# Patient Record
Sex: MALE | Race: WHITE
[De-identification: names, ages, dates, MRNs, and addresses within clinical notes are randomized per-mention and may not be internally consistent; named-entity substitution may affect disease eponyms.]

---

## 2019-01-16 PROBLEM — Z00.00 ENCOUNTER FOR PREVENTIVE HEALTH EXAMINATION: Status: ACTIVE | Noted: 2019-01-16

## 2019-01-29 ENCOUNTER — APPOINTMENT (OUTPATIENT)
Dept: PAIN MANAGEMENT | Facility: CLINIC | Age: 59
End: 2019-01-29
Payer: COMMERCIAL

## 2019-01-29 VITALS
BODY MASS INDEX: 33.86 KG/M2 | WEIGHT: 250 LBS | HEIGHT: 72 IN | SYSTOLIC BLOOD PRESSURE: 112 MMHG | DIASTOLIC BLOOD PRESSURE: 70 MMHG

## 2019-01-29 DIAGNOSIS — M75.52 BURSITIS OF LEFT SHOULDER: ICD-10-CM

## 2019-01-29 DIAGNOSIS — Z87.39 PERSONAL HISTORY OF OTHER DISEASES OF THE MUSCULOSKELETAL SYSTEM AND CONNECTIVE TISSUE: ICD-10-CM

## 2019-01-29 DIAGNOSIS — M79.18 MYALGIA, OTHER SITE: ICD-10-CM

## 2019-01-29 DIAGNOSIS — M19.012 PRIMARY OSTEOARTHRITIS, LEFT SHOULDER: ICD-10-CM

## 2019-01-29 DIAGNOSIS — Z80.9 FAMILY HISTORY OF MALIGNANT NEOPLASM, UNSPECIFIED: ICD-10-CM

## 2019-01-29 DIAGNOSIS — Z83.3 FAMILY HISTORY OF DIABETES MELLITUS: ICD-10-CM

## 2019-01-29 DIAGNOSIS — Z86.79 PERSONAL HISTORY OF OTHER DISEASES OF THE CIRCULATORY SYSTEM: ICD-10-CM

## 2019-01-29 DIAGNOSIS — Z78.9 OTHER SPECIFIED HEALTH STATUS: ICD-10-CM

## 2019-01-29 PROCEDURE — 99204 OFFICE O/P NEW MOD 45 MIN: CPT

## 2019-01-29 RX ORDER — LISINOPRIL 10 MG/1
10 TABLET ORAL
Refills: 0 | Status: ACTIVE | COMMUNITY

## 2019-01-29 RX ORDER — TRAMADOL HYDROCHLORIDE 50 MG/1
50 TABLET, COATED ORAL
Refills: 0 | Status: ACTIVE | COMMUNITY

## 2019-01-29 NOTE — PHYSICAL EXAM
[Normal muscle bulk without asymmetry] : normal muscle bulk without asymmetry [Within functional limits and without pain] : within functional limits and without pain [Facet Tenderness] : no facet tenderness [Spurling] : negative Spurling's Test [Schroeder] : positive Schroeder Test [Neer's] : positive Neer's Test [] : Motor: [NL] : normal and symmetric bilaterally [Normal] : Normal affect [de-identified] : left shoulder limited ROM

## 2019-01-29 NOTE — HISTORY OF PRESENT ILLNESS
[___ mths] : [unfilled] month(s) ago [7] : a maximum pain level of 7/10 [Sharp] : sharp [Rest] : rest [FreeTextEntry1] : \par HPI\par \par Mr. REJI KOROMA is a 58 year M with pmhx of proteinuria, htn, right bicep repair 2013, L4-5 decompression 30 years ago.  Presents with left shoulder pain radiating lateral arm \par \par \par Previous and current pain medications/doses/effects:\par \par Previous Pain Treatments:\par \par PT with mild relief\par \par Previous Pain Injections:\par \par PT without relief\par \par Previous Diagnostic Studies/Images:\par \par \par  [FreeTextEntry7] : left shoulder/arm [FreeTextEntry3] : movement

## 2019-01-29 NOTE — ASSESSMENT
[FreeTextEntry1] : subacromial bursitis pain refractory to conservative treatments. will schedule repeat left subacromial bursa steroid injection r/b/a discussed\par \par informed patient of risks of steroid administration including transient worsening of blood glucose, htn, mood changes, progressive osteoporosis.  Encouraged to call with questions and concerns.\par \par XR to evaluate for left shoulder osteoarthritis\par \par PT pending intervention\par \par \par The above diagnosis and treatment plan is medically reasonable and necessary based on the patient encounter.\par \par There were no barriers to communication.\par Informed patient that I would be available for any additional questions.\par Patient was instructed to call with any worsening symptoms including severe pain, new numbness/weakness, or changes in the bowel/bladder function. \par \par Instructed patient to maintain pain diary to monitor pain level, mobility, and function.\par \par \par

## 2019-02-14 ENCOUNTER — APPOINTMENT (OUTPATIENT)
Dept: PAIN MANAGEMENT | Facility: HOSPITAL | Age: 59
End: 2019-02-14

## 2023-02-13 ENCOUNTER — OFFICE (OUTPATIENT)
Dept: URBAN - METROPOLITAN AREA CLINIC 122 | Facility: CLINIC | Age: 63
Setting detail: OPHTHALMOLOGY
End: 2023-02-13
Payer: COMMERCIAL

## 2023-02-13 DIAGNOSIS — H25.13: ICD-10-CM

## 2023-02-13 DIAGNOSIS — H52.4: ICD-10-CM

## 2023-02-13 DIAGNOSIS — H40.012: ICD-10-CM

## 2023-02-13 PROBLEM — D31.32 CHOROIDAL NEVUS, LEFT EYE: Status: ACTIVE | Noted: 2023-02-13

## 2023-02-13 PROCEDURE — 92015 DETERMINE REFRACTIVE STATE: CPT | Performed by: OPHTHALMOLOGY

## 2023-02-13 PROCEDURE — 92014 COMPRE OPH EXAM EST PT 1/>: CPT | Performed by: OPHTHALMOLOGY

## 2023-02-13 PROCEDURE — 92250 FUNDUS PHOTOGRAPHY W/I&R: CPT | Performed by: OPHTHALMOLOGY

## 2023-02-13 PROCEDURE — 92083 EXTENDED VISUAL FIELD XM: CPT | Performed by: OPHTHALMOLOGY

## 2023-02-13 ASSESSMENT — REFRACTION_MANIFEST
OD_AXIS: 135
OD_VA1: 20/20
OS_VA1: 20/20
OD_ADD: +2.50
OS_SPHERE: -0.75
OD_CYLINDER: -1.50
OS_CYLINDER: -2.00
OS_SPHERE: -0.50
OS_AXIS: 30
OD_CYLINDER: -1.50
OD_SPHERE: +0.50
OS_AXIS: 30
OD_ADD: +2.50
OS_VA1: 20/20
OS_ADD: +2.50
OS_ADD: +2.50
OD_SPHERE: +0.50
OD_AXIS: 120
OS_CYLINDER: -2.00
OD_VA1: 20/20

## 2023-02-13 ASSESSMENT — TEAR BREAK UP TIME (TBUT)
OS_TBUT: 2+
OD_TBUT: 2+

## 2023-02-13 ASSESSMENT — PACHYMETRY
OS_CT_CORRECTION: 1
OD_CT_CORRECTION: 1
OD_CT_UM: 533
OS_CT_UM: 536

## 2023-02-13 ASSESSMENT — VISUAL ACUITY
OD_BCVA: 20/25+2
OS_BCVA: 20/20

## 2023-02-13 ASSESSMENT — SPHEQUIV_DERIVED
OS_SPHEQUIV: -1.75
OS_SPHEQUIV: -1.5
OD_SPHEQUIV: -0.25
OS_SPHEQUIV: -1.75
OD_SPHEQUIV: -0.25

## 2023-02-13 ASSESSMENT — TONOMETRY
OS_IOP_MMHG: 19
OD_IOP_MMHG: 15

## 2023-02-13 ASSESSMENT — REFRACTION_AUTOREFRACTION
OD_SPHERE: PLANO
OS_SPHERE: -0.75
OS_CYLINDER: -2.00
OD_CYLINDER: -1.25
OD_AXIS: 127
OS_AXIS: 32

## 2023-02-13 ASSESSMENT — REFRACTION_CURRENTRX
OS_VPRISM_DIRECTION: SV
OD_VPRISM_DIRECTION: SV
OD_CYLINDER: -1.50
OS_OVR_VA: 20/
OD_OVR_VA: 20/
OD_SPHERE: +0.50
OS_AXIS: 30
OS_CYLINDER: -2.00
OD_AXIS: 120
OS_SPHERE: -0.75

## 2023-02-13 ASSESSMENT — CORNEAL SURGICAL SCARRING: OS_SCARRING: MID PERIPHERAL ANTERIOR STROMAL

## 2023-02-13 ASSESSMENT — CONFRONTATIONAL VISUAL FIELD TEST (CVF)
OS_FINDINGS: FULL
OD_FINDINGS: FULL

## 2025-06-10 ENCOUNTER — OFFICE (OUTPATIENT)
Dept: URBAN - METROPOLITAN AREA CLINIC 122 | Facility: CLINIC | Age: 65
Setting detail: OPHTHALMOLOGY
End: 2025-06-10
Payer: COMMERCIAL

## 2025-06-10 DIAGNOSIS — D31.32: ICD-10-CM

## 2025-06-10 DIAGNOSIS — H52.4: ICD-10-CM

## 2025-06-10 DIAGNOSIS — H04.123: ICD-10-CM

## 2025-06-10 DIAGNOSIS — H40.012: ICD-10-CM

## 2025-06-10 DIAGNOSIS — H25.13: ICD-10-CM

## 2025-06-10 PROBLEM — H17.9 CORNEAL SCAR: Status: ACTIVE | Noted: 2025-06-10

## 2025-06-10 PROCEDURE — 92133 CPTRZD OPH DX IMG PST SGM ON: CPT | Performed by: OPHTHALMOLOGY

## 2025-06-10 PROCEDURE — 92014 COMPRE OPH EXAM EST PT 1/>: CPT | Performed by: OPHTHALMOLOGY

## 2025-06-10 PROCEDURE — 92015 DETERMINE REFRACTIVE STATE: CPT | Performed by: OPHTHALMOLOGY

## 2025-06-10 PROCEDURE — 92083 EXTENDED VISUAL FIELD XM: CPT | Performed by: OPHTHALMOLOGY

## 2025-06-10 ASSESSMENT — REFRACTION_MANIFEST
OD_CYLINDER: -1.50
OS_AXIS: 30
OD_AXIS: 135
OD_ADD: +2.50
OS_VA1: 20/20
OD_VA1: 20/20
OS_ADD: +2.50
OS_CYLINDER: -2.00
OS_ADD: +2.50
OS_ADD: +2.50
OS_SPHERE: -0.75
OD_VA1: 20/20
OS_CYLINDER: -1.75
OD_AXIS: 145
OD_CYLINDER: -1.50
OD_SPHERE: +0.50
OS_SPHERE: -0.50
OD_ADD: +2.50
OD_VA1: 20/20
OD_CYLINDER: -1.25
OS_SPHERE: -0.50
OS_AXIS: 30
OD_AXIS: 120
OD_ADD: +2.50
OD_SPHERE: PLANO
OD_SPHERE: +0.50
OS_AXIS: 25
OS_CYLINDER: -2.00

## 2025-06-10 ASSESSMENT — REFRACTION_CURRENTRX
OS_OVR_VA: 20/
OD_SPHERE: +0.50
OD_VPRISM_DIRECTION: SV
OD_OVR_VA: 20/
OD_AXIS: 120
OS_ADD: +2.50
OS_VPRISM_DIRECTION: SV
OS_AXIS: 30
OS_CYLINDER: -2.00
OD_CYLINDER: -1.50
OS_SPHERE: -0.50
OD_ADD: +2.50

## 2025-06-10 ASSESSMENT — REFRACTION_AUTOREFRACTION
OD_SPHERE: PLANO
OS_SPHERE: -0.75
OD_AXIS: 127
OS_CYLINDER: -2.00
OS_AXIS: 32
OD_CYLINDER: -1.25

## 2025-06-10 ASSESSMENT — CONFRONTATIONAL VISUAL FIELD TEST (CVF)
OD_FINDINGS: FULL
OS_FINDINGS: FULL

## 2025-06-10 ASSESSMENT — VISUAL ACUITY
OS_BCVA: 20/20
OD_BCVA: 20/25+2

## 2025-06-10 ASSESSMENT — TONOMETRY
OD_IOP_MMHG: 20
OS_IOP_MMHG: 19

## 2025-06-10 ASSESSMENT — PACHYMETRY
OS_CT_CORRECTION: 1
OD_CT_UM: 533
OD_CT_CORRECTION: 1
OS_CT_UM: 536

## 2025-06-10 ASSESSMENT — CORNEAL SURGICAL SCARRING: OS_SCARRING: MID PERIPHERAL ANTERIOR STROMAL

## 2025-06-10 ASSESSMENT — TEAR BREAK UP TIME (TBUT)
OD_TBUT: 2+
OS_TBUT: 2+